# Patient Record
Sex: FEMALE | Race: OTHER | HISPANIC OR LATINO
[De-identification: names, ages, dates, MRNs, and addresses within clinical notes are randomized per-mention and may not be internally consistent; named-entity substitution may affect disease eponyms.]

---
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
## 2023-01-22 ENCOUNTER — RX RENEWAL (OUTPATIENT)
Age: 18
End: 2023-01-22

## 2023-01-22 RX ORDER — NORETHINDRONE ACETATE AND ETHINYL ESTRADIOL, ETHINYL ESTRADIOL AND FERROUS FUMARATE 1MG-10(24)
1 MG-10 MCG / KIT ORAL DAILY
Qty: 84 | Refills: 0 | Status: ACTIVE | COMMUNITY
Start: 2022-05-24 | End: 1900-01-01

## 2023-05-02 ENCOUNTER — RX RENEWAL (OUTPATIENT)
Age: 18
End: 2023-05-02

## 2023-05-23 ENCOUNTER — NON-APPOINTMENT (OUTPATIENT)
Age: 18
End: 2023-05-23

## 2023-07-25 ENCOUNTER — RX RENEWAL (OUTPATIENT)
Age: 18
End: 2023-07-25

## 2023-07-31 ENCOUNTER — EMERGENCY (EMERGENCY)
Facility: HOSPITAL | Age: 18
LOS: 0 days | Discharge: HOME | End: 2023-07-31
Attending: PEDIATRICS
Payer: COMMERCIAL

## 2023-07-31 VITALS
WEIGHT: 149.91 LBS | OXYGEN SATURATION: 99 % | SYSTOLIC BLOOD PRESSURE: 110 MMHG | RESPIRATION RATE: 18 BRPM | TEMPERATURE: 98 F | HEART RATE: 70 BPM | DIASTOLIC BLOOD PRESSURE: 73 MMHG

## 2023-07-31 DIAGNOSIS — K51.90 ULCERATIVE COLITIS, UNSPECIFIED, WITHOUT COMPLICATIONS: ICD-10-CM
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
## 2023-07-31 DIAGNOSIS — S00.451A SUPERFICIAL FOREIGN BODY OF RIGHT EAR, INITIAL ENCOUNTER: ICD-10-CM

## 2023-07-31 DIAGNOSIS — Z91.018 ALLERGY TO OTHER FOODS: ICD-10-CM
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
## 2023-07-31 PROCEDURE — 99282 EMERGENCY DEPT VISIT SF MDM: CPT | Mod: 25

## 2023-07-31 PROCEDURE — 10120 INC&RMVL FB SUBQ TISS SMPL: CPT

## 2023-07-31 PROCEDURE — 99283 EMERGENCY DEPT VISIT LOW MDM: CPT

## 2023-07-31 NOTE — ED PROVIDER NOTE - PHYSICAL EXAMINATION
_  CONSTITUTIONAL: NAD  SKIN: Warm, dry; +right earlobe with 2 stud earrings, super earring with plastic backing embedded into earlobe posteriorly   HEAD: NCAT  EYES: Clear conjunctiva   ENT: MMM  NECK: Supple  CARD: No JVD, no cyanosis  RESP: Speaking in full sentences; symmetric rise and fall of chest  NEURO: Grossly intact  PSYCH: Cooperative, appropriate

## 2023-07-31 NOTE — ED ADULT NURSE NOTE - OBJECTIVE STATEMENT
Patient presents with complaints of  foreign body in right ear.  Reports her earring is stuck in the ear.

## 2023-07-31 NOTE — ED ADULT NURSE NOTE - NSFALLUNIVINTERV_ED_ALL_ED
Bed/Stretcher in lowest position, wheels locked, appropriate side rails in place/Call bell, personal items and telephone in reach/Instruct patient to call for assistance before getting out of bed/chair/stretcher/Non-slip footwear applied when patient is off stretcher/[place] to call system/Physically safe environment - no spills, clutter or unnecessary equipment/Purposeful proactive rounding/Room/bathroom lighting operational, light cord in reach

## 2023-07-31 NOTE — ED PROVIDER NOTE - OBJECTIVE STATEMENT
Patient is a 17-year-old otherwise male with a history of ulcerative colitis on Humira, presenting for foreign body in the right earlobe for several weeks.  Patient has had her ears pierced for approximately 5 years.  She has a stud earring in the right earlobe with the plastic backing, which has slowly migrated into the earlobe over the period of the last several weeks. No fever, pain, discharge from the earlobe, or redness. No other complaints - no chest pain, shortness of breath, vomiting.

## 2023-07-31 NOTE — ED PROVIDER NOTE - PROGRESS NOTE DETAILS
Resident AO: Earring backing removed successfully. Patient given strict return precautions regarding infection development given that patient is on Humira for UC.

## 2023-07-31 NOTE — ED PROVIDER NOTE - NSFOLLOWUPINSTRUCTIONS_ED_ALL_ED_FT
Skin Foreign Body    A skin foreign body is an object that is stuck in the skin. Common objects that get stuck in the skin include:  Wood splinters.  Glass or fiberglass slivers.  Rocks or gravel.  Metallic objects, such as nails, needles, fish hooks, and BBs.  Thorns and cactus spines.  Foreign bodies may damage tissue or cause infection. If the foreign body does not cause any pain or infection, it may be okay to leave it in the skin.    A growth called a granuloma may form around a foreign body that is left in the skin.    What are the causes?  This condition is caused by an object getting lodged under the skin, usually by accident.    What increases the risk?  Children who play in areas with wood, metal, or glass are at a higher risk of getting a foreign body. Adults may get a skin foreign body after breaking glass or while working with wood, fiberglass, or stone. In some cases, the object may get stuck in an open wound after an injury.    What are the signs or symptoms?  Symptoms of this condition include:  Pain or tenderness.  A feeling of something being stuck under the skin.  Redness.  Swelling.    How is this diagnosed?  This condition is diagnosed based on:  Your medical history and symptoms.  A physical exam.  Imaging tests, such as:  X-rays.  CT scans.  Ultrasounds.    How is this treated?  Treatment for this condition depends on what the foreign body is, where it is, and whether it is causing infection or other symptoms. Treatment may involve:  Flushing the affected area with a salt water solution to remove dirt or debris.  Removing all or part of the object with a needle and metal tweezers. In some cases, an incision may be made in the skin to allow access to the object.  Waiting to remove the object until it moves closer to the surface of the skin. This may take several days.  Leaving the object in place. This may be done if the object is not causing any symptoms or if removal will cause more damage to the skin or tissue.  Taking antibiotic medicines or using antibiotic ointment to treat or prevent infection.  Having a surgical procedure to remove a foreign body that is deep inside the tissue or that has been covered by a granuloma.  Follow these instructions at home:  Wound or incision care    Two stitched wounds. One is normal. The other is red with pus and infected.   If the foreign body was removed, follow instructions from your health care provider about how to take care of your wound or incision. Make sure you:  Wash your hands with soap and water for at least 20 seconds before and after you change your bandage (dressing). If soap and water are not available, use hand .  Change your dressing as told by your health care provider.  Leave stitches (sutures), skin glue, or adhesive strips in place. These skin closures may need to stay in place for 2 weeks or longer. If adhesive strip edges start to loosen and curl up, you may trim the loose edges. Do not remove adhesive strips completely unless your health care provider tells you to do that.  Check your wound or incision every day for signs of infection. This is especially important if the foreign body was left in place in the skin. Check for:  More redness, swelling, or pain.  More fluid or blood.  Warmth.  Pus or a bad smell.  If the foreign body was in your lip, you may be directed to rinse your mouth with a salt water mixture 3–4 times per day or as needed. To make salt water, completely dissolve ½–1 tsp (3–6 g) of salt in 1 cup (237 mL) of warm water.    General instructions  Take over-the-counter and prescription medicines only as told by your health care provider.  If you were prescribed an antibiotic medicine or ointment, use it as told by your health care provider. Do not stop using the antibiotic even if you start to feel better.  Keep all follow-up visits. This is important.    Contact a health care provider if:  You develop more pain or other new symptoms around the area where the object entered the skin.  You have more redness, swelling, or pain around your wound or incision.  You have more fluid or blood coming from your wound or incision.  Your wound or incision feels warm to the touch.  You have pus or a bad smell coming from your wound or incision.  You have a fever.    Get help right away if:  You have severe pain that does not get better with medicine. Skin Foreign Body    A skin foreign body is an object that is stuck in the skin. Common objects that get stuck in the skin include:  Wood splinters.  Glass or fiberglass slivers.  Rocks or gravel.  Metallic objects, such as nails, needles, fish hooks, and BBs.  Thorns and cactus spines.  Foreign bodies may damage tissue or cause infection. If the foreign body does not cause any pain or infection, it may be okay to leave it in the skin.    A growth called a granuloma may form around a foreign body that is left in the skin.    What are the causes?  This condition is caused by an object getting lodged under the skin, usually by accident.    What increases the risk?  Children who play in areas with wood, metal, or glass are at a higher risk of getting a foreign body. Adults may get a skin foreign body after breaking glass or while working with wood, fiberglass, or stone. In some cases, the object may get stuck in an open wound after an injury.    What are the signs or symptoms?  Symptoms of this condition include:  Pain or tenderness.  A feeling of something being stuck under the skin.  Redness.  Swelling.      Follow these instructions at home:  Wound or incision care  If the foreign body was removed, follow instructions from your health care provider about how to take care of your wound or incision. Make sure you:  Wash your hands with soap and water for at least 20 seconds before and after you change your bandage (dressing). If soap and water are not available, use hand .  Change your dressing as told by your health care provider.    Check your wound or incision every day for signs of infection. This is especially important if the foreign body was left in place in the skin. Check for:  More redness, swelling, or pain.  More fluid or blood.  Warmth.  Pus or a bad smell.  If the foreign body was in your lip, you may be directed to rinse your mouth with a salt water mixture 3–4 times per day or as needed. To make salt water, completely dissolve ½–1 tsp (3–6 g) of salt in 1 cup (237 mL) of warm water.    General instructions  Take over-the-counter and prescription medicines only as told by your health care provider.  If you were prescribed an antibiotic medicine or ointment, use it as told by your health care provider. Do not stop using the antibiotic even if you start to feel better.  Keep all follow-up visits. This is important.    Contact a health care provider if:  You develop more pain or other new symptoms around the area where the object entered the skin.  You have more redness, swelling, or pain around your wound or incision.  You have more fluid or blood coming from your wound or incision.  Your wound or incision feels warm to the touch.  You have pus or a bad smell coming from your wound or incision.  You have a fever.    Get help right away if:  You have severe pain that does not get better with medicine.

## 2023-07-31 NOTE — ED PROVIDER NOTE - PATIENT PORTAL LINK FT
You can access the FollowMyHealth Patient Portal offered by [facility] by registering at the following website: [email or web address]. By joining [facility]’s FollowMyHealth portal, you will also be able to view your health information using other applications (apps) compatible with our system.

## 2024-01-09 ENCOUNTER — RX RENEWAL (OUTPATIENT)
Age: 19
End: 2024-01-09

## 2024-01-10 ENCOUNTER — NON-APPOINTMENT (OUTPATIENT)
Age: 19
End: 2024-01-10

## 2024-01-18 ENCOUNTER — APPOINTMENT (OUTPATIENT)
Dept: OBGYN | Facility: CLINIC | Age: 19
End: 2024-01-18
Payer: SELF-PAY

## 2024-01-18 ENCOUNTER — NON-APPOINTMENT (OUTPATIENT)
Age: 19
End: 2024-01-18

## 2024-01-18 PROCEDURE — 99442: CPT

## 2024-01-18 RX ORDER — NORETHINDRONE ACETATE AND ETHINYL ESTRADIOL AND FERROUS FUMARATE 1MG-20(21)
1-20 KIT ORAL
Qty: 84 | Refills: 1 | Status: ACTIVE | COMMUNITY
Start: 2023-05-02 | End: 1900-01-01

## 2024-05-23 ENCOUNTER — APPOINTMENT (OUTPATIENT)
Dept: OBGYN | Facility: CLINIC | Age: 19
End: 2024-05-23
Payer: COMMERCIAL

## 2024-05-23 VITALS
BODY MASS INDEX: 27.38 KG/M2 | SYSTOLIC BLOOD PRESSURE: 110 MMHG | DIASTOLIC BLOOD PRESSURE: 71 MMHG | HEART RATE: 75 BPM | WEIGHT: 145 LBS | HEIGHT: 61 IN

## 2024-05-23 DIAGNOSIS — Z30.41 ENCOUNTER FOR SURVEILLANCE OF CONTRACEPTIVE PILLS: ICD-10-CM

## 2024-05-23 DIAGNOSIS — Z86.19 PERSONAL HISTORY OF OTHER INFECTIOUS AND PARASITIC DISEASES: ICD-10-CM

## 2024-05-23 LAB
HCG UR QL: NEGATIVE
QUALITY CONTROL: YES

## 2024-05-23 PROCEDURE — 99395 PREV VISIT EST AGE 18-39: CPT

## 2024-05-23 PROCEDURE — 81025 URINE PREGNANCY TEST: CPT

## 2024-05-23 RX ORDER — NORETHINDRONE ACETATE AND ETHINYL ESTRADIOL AND FERROUS FUMARATE 1MG-20(21)
1-20 KIT ORAL DAILY
Qty: 1 | Refills: 3 | Status: ACTIVE | COMMUNITY
Start: 2024-05-23 | End: 1900-01-01

## 2024-05-23 NOTE — HISTORY OF PRESENT ILLNESS
[FreeTextEntry1] : 19 yo  P-0 LMP-   not getting menses on birth control pills  Is here for annual exam ,   had positive chlamydia 2 months ago  in [place], treated with Zithromax , desires test of cure.  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

## 2024-05-23 NOTE — DISCUSSION/SUMMARY
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

## 2024-05-31 ENCOUNTER — NON-APPOINTMENT (OUTPATIENT)
Age: 19
End: 2024-05-31

## 2024-05-31 DIAGNOSIS — A74.9 CHLAMYDIAL INFECTION, UNSPECIFIED: ICD-10-CM

## 2024-06-02 RX ORDER — AZITHROMYCIN 500 MG/1
500 TABLET, FILM COATED ORAL
Qty: 2 | Refills: 0 | Status: ACTIVE | COMMUNITY
Start: 2024-05-31

## 2024-06-03 LAB
A VAGINAE DNA VAG QL NAA+PROBE: ABNORMAL
BVAB2 DNA VAG QL NAA+PROBE: NORMAL
C KRUSEI DNA VAG QL NAA+PROBE: NEGATIVE
C TRACH RRNA SPEC QL NAA+PROBE: POSITIVE
CANDIDA DNA VAG QL NAA+PROBE: NEGATIVE
MEGA1 DNA VAG QL NAA+PROBE: NORMAL
N GONORRHOEA RRNA SPEC QL NAA+PROBE: NEGATIVE
T VAGINALIS RRNA SPEC QL NAA+PROBE: NEGATIVE

## 2024-07-06 ENCOUNTER — RX RENEWAL (OUTPATIENT)
Age: 19
End: 2024-07-06

## 2024-08-12 ENCOUNTER — APPOINTMENT (OUTPATIENT)
Dept: OBGYN | Facility: CLINIC | Age: 19
End: 2024-08-12
Payer: COMMERCIAL

## 2024-08-12 VITALS
HEART RATE: 70 BPM | HEIGHT: 61 IN | WEIGHT: 145 LBS | TEMPERATURE: 98.6 F | DIASTOLIC BLOOD PRESSURE: 72 MMHG | SYSTOLIC BLOOD PRESSURE: 118 MMHG | BODY MASS INDEX: 27.38 KG/M2

## 2024-08-12 DIAGNOSIS — Z11.3 ENCOUNTER FOR SCREENING FOR INFECTIONS WITH A PREDOMINANTLY SEXUAL MODE OF TRANSMISSION: ICD-10-CM

## 2024-08-12 PROCEDURE — 99213 OFFICE O/P EST LOW 20 MIN: CPT

## 2024-08-12 NOTE — HISTORY OF PRESENT ILLNESS
[FreeTextEntry1] : 19 yo p0 lmp 7/24  desires std testing. 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

## 2025-02-14 NOTE — ED ADULT NURSE NOTE - CHIEF COMPLAINT
Order prepped and routed to refill pool to engage refill protocols.   The patient is a 17y Female complaining of foreign body, ear.

## 2025-04-21 ENCOUNTER — RX RENEWAL (OUTPATIENT)
Age: 20
End: 2025-04-21

## 2025-06-05 ENCOUNTER — NON-APPOINTMENT (OUTPATIENT)
Age: 20
End: 2025-06-05

## 2025-06-05 ENCOUNTER — APPOINTMENT (OUTPATIENT)
Dept: OBGYN | Facility: CLINIC | Age: 20
End: 2025-06-05
Payer: COMMERCIAL

## 2025-06-05 ENCOUNTER — LABORATORY RESULT (OUTPATIENT)
Age: 20
End: 2025-06-05

## 2025-06-05 VITALS
DIASTOLIC BLOOD PRESSURE: 68 MMHG | SYSTOLIC BLOOD PRESSURE: 110 MMHG | HEART RATE: 79 BPM | WEIGHT: 140 LBS | HEIGHT: 62 IN | BODY MASS INDEX: 25.76 KG/M2

## 2025-06-05 DIAGNOSIS — Z00.00 ENCOUNTER FOR GENERAL ADULT MEDICAL EXAMINATION W/OUT ABNORMAL FINDINGS: ICD-10-CM

## 2025-06-05 DIAGNOSIS — Z11.3 ENCOUNTER FOR SCREENING FOR INFECTIONS WITH A PREDOMINANTLY SEXUAL MODE OF TRANSMISSION: ICD-10-CM

## 2025-06-05 LAB
HCG UR QL: NEGATIVE
QUALITY CONTROL: YES

## 2025-06-05 PROCEDURE — 99395 PREV VISIT EST AGE 18-39: CPT

## 2025-06-05 PROCEDURE — 81025 URINE PREGNANCY TEST: CPT

## 2025-06-10 LAB
C TRACH RRNA SPEC QL NAA+PROBE: NOT DETECTED
HPV HIGH+LOW RISK DNA PNL CVX: DETECTED
N GONORRHOEA RRNA SPEC QL NAA+PROBE: NOT DETECTED
SOURCE AMPLIFICATION: NORMAL
SOURCE AMPLIFICATION: NORMAL
T VAGINALIS RRNA SPEC QL NAA+PROBE: NOT DETECTED

## 2025-06-11 ENCOUNTER — RX RENEWAL (OUTPATIENT)
Age: 20
End: 2025-06-11

## 2025-06-12 ENCOUNTER — NON-APPOINTMENT (OUTPATIENT)
Age: 20
End: 2025-06-12

## 2025-06-23 ENCOUNTER — NON-APPOINTMENT (OUTPATIENT)
Age: 20
End: 2025-06-23